# Patient Record
Sex: FEMALE | Race: WHITE | Employment: OTHER | URBAN - METROPOLITAN AREA
[De-identification: names, ages, dates, MRNs, and addresses within clinical notes are randomized per-mention and may not be internally consistent; named-entity substitution may affect disease eponyms.]

---

## 2018-01-24 NOTE — PROGRESS NOTES
Pre call completed with patient regarding upcoming kyphoplasty. Patient states her last dose of Aspirin was 1/24/2018.

## 2018-01-25 ENCOUNTER — ANESTHESIA (OUTPATIENT)
Dept: INTERVENTIONAL RADIOLOGY/VASCULAR | Age: 83
End: 2018-01-25
Payer: MEDICARE

## 2018-01-25 ENCOUNTER — HOSPITAL ENCOUNTER (OUTPATIENT)
Dept: INTERVENTIONAL RADIOLOGY/VASCULAR | Age: 83
Discharge: HOME | End: 2018-01-25
Attending: ORTHOPAEDIC SURGERY
Payer: MEDICARE

## 2018-01-25 ENCOUNTER — ANESTHESIA EVENT (OUTPATIENT)
Dept: INTERVENTIONAL RADIOLOGY/VASCULAR | Age: 83
End: 2018-01-25
Payer: MEDICARE

## 2018-01-25 VITALS
BODY MASS INDEX: 18.21 KG/M2 | HEART RATE: 91 BPM | SYSTOLIC BLOOD PRESSURE: 120 MMHG | OXYGEN SATURATION: 91 % | RESPIRATION RATE: 23 BRPM | HEIGHT: 67 IN | DIASTOLIC BLOOD PRESSURE: 57 MMHG | WEIGHT: 116 LBS | TEMPERATURE: 98.2 F

## 2018-01-25 DIAGNOSIS — S22.069A: ICD-10-CM

## 2018-01-25 DIAGNOSIS — S22.060S TRAUMATIC COMPRESSION FRACTURE OF T8 THORACIC VERTEBRA, SEQUELA: ICD-10-CM

## 2018-01-25 LAB
ANION GAP SERPL CALC-SCNC: 6 MMOL/L (ref 5–15)
ATRIAL RATE: 74 BPM
BASOPHILS # BLD: 0.1 K/UL (ref 0–0.1)
BASOPHILS NFR BLD: 1 % (ref 0–1)
BUN SERPL-MCNC: 9 MG/DL (ref 6–20)
BUN/CREAT SERPL: 13 (ref 12–20)
CALCIUM SERPL-MCNC: 9.3 MG/DL (ref 8.5–10.1)
CALCULATED P AXIS, ECG09: 101 DEGREES
CALCULATED R AXIS, ECG10: -36 DEGREES
CALCULATED T AXIS, ECG11: 62 DEGREES
CHLORIDE SERPL-SCNC: 97 MMOL/L (ref 97–108)
CO2 SERPL-SCNC: 25 MMOL/L (ref 21–32)
CREAT SERPL-MCNC: 0.67 MG/DL (ref 0.55–1.02)
DIAGNOSIS, 93000: NORMAL
EOSINOPHIL # BLD: 0.1 K/UL (ref 0–0.4)
EOSINOPHIL NFR BLD: 1 % (ref 0–7)
ERYTHROCYTE [DISTWIDTH] IN BLOOD BY AUTOMATED COUNT: 14.1 % (ref 11.5–14.5)
GLUCOSE SERPL-MCNC: 91 MG/DL (ref 65–100)
HCT VFR BLD AUTO: 46.7 % (ref 35–47)
HGB BLD-MCNC: 15.8 G/DL (ref 11.5–16)
LYMPHOCYTES # BLD: 1.2 K/UL (ref 0.8–3.5)
LYMPHOCYTES NFR BLD: 12 % (ref 12–49)
MCH RBC QN AUTO: 30.4 PG (ref 26–34)
MCHC RBC AUTO-ENTMCNC: 33.8 G/DL (ref 30–36.5)
MCV RBC AUTO: 90 FL (ref 80–99)
MONOCYTES # BLD: 0.7 K/UL (ref 0–1)
MONOCYTES NFR BLD: 7 % (ref 5–13)
NEUTS SEG # BLD: 7.9 K/UL (ref 1.8–8)
NEUTS SEG NFR BLD: 79 % (ref 32–75)
P-R INTERVAL, ECG05: 164 MS
PLATELET # BLD AUTO: 342 K/UL (ref 150–400)
POTASSIUM SERPL-SCNC: 4.6 MMOL/L (ref 3.5–5.1)
Q-T INTERVAL, ECG07: 376 MS
QRS DURATION, ECG06: 72 MS
QTC CALCULATION (BEZET), ECG08: 417 MS
RBC # BLD AUTO: 5.19 M/UL (ref 3.8–5.2)
SODIUM SERPL-SCNC: 128 MMOL/L (ref 136–145)
VENTRICULAR RATE, ECG03: 74 BPM
WBC # BLD AUTO: 9.9 K/UL (ref 3.6–11)

## 2018-01-25 PROCEDURE — 77030034842 HC TAMP SPN BN INFL EXP II KYPH -I

## 2018-01-25 PROCEDURE — 77030003666 HC NDL SPINAL BD -A

## 2018-01-25 PROCEDURE — 77030030399

## 2018-01-25 PROCEDURE — 74011000250 HC RX REV CODE- 250

## 2018-01-25 PROCEDURE — 74011250637 HC RX REV CODE- 250/637: Performed by: RADIOLOGY

## 2018-01-25 PROCEDURE — 74011250636 HC RX REV CODE- 250/636: Performed by: RADIOLOGY

## 2018-01-25 PROCEDURE — 80048 BASIC METABOLIC PNL TOTAL CA: CPT | Performed by: ANESTHESIOLOGY

## 2018-01-25 PROCEDURE — 77030021783 HC SYS CEM DEL MEDT -D

## 2018-01-25 PROCEDURE — 74011250636 HC RX REV CODE- 250/636

## 2018-01-25 PROCEDURE — 76060000033 HC ANESTHESIA 1 TO 1.5 HR

## 2018-01-25 PROCEDURE — 93005 ELECTROCARDIOGRAM TRACING: CPT

## 2018-01-25 PROCEDURE — 74011636320 HC RX REV CODE- 636/320: Performed by: RADIOLOGY

## 2018-01-25 PROCEDURE — 85025 COMPLETE CBC W/AUTO DIFF WBC: CPT | Performed by: ANESTHESIOLOGY

## 2018-01-25 PROCEDURE — 22513 PERQ VERTEBRAL AUGMENTATION: CPT

## 2018-01-25 PROCEDURE — C1713 ANCHOR/SCREW BN/BN,TIS/BN: HCPCS

## 2018-01-25 PROCEDURE — 77030021782 HC SYS CEM CART DEL KYPH -C

## 2018-01-25 PROCEDURE — 74011000250 HC RX REV CODE- 250: Performed by: RADIOLOGY

## 2018-01-25 PROCEDURE — 36415 COLL VENOUS BLD VENIPUNCTURE: CPT | Performed by: ANESTHESIOLOGY

## 2018-01-25 RX ORDER — IPRATROPIUM BROMIDE 0.5 MG/2.5ML
SOLUTION RESPIRATORY (INHALATION)
Status: COMPLETED
Start: 2018-01-25 | End: 2018-01-25

## 2018-01-25 RX ORDER — PROPOFOL 10 MG/ML
INJECTION, EMULSION INTRAVENOUS
Status: DISCONTINUED | OUTPATIENT
Start: 2018-01-25 | End: 2018-01-25 | Stop reason: HOSPADM

## 2018-01-25 RX ORDER — ZOLPIDEM TARTRATE 10 MG/1
10 TABLET ORAL
COMMUNITY

## 2018-01-25 RX ORDER — BUPIVACAINE HYDROCHLORIDE 5 MG/ML
5 INJECTION, SOLUTION EPIDURAL; INTRACAUDAL
Status: COMPLETED | OUTPATIENT
Start: 2018-01-25 | End: 2018-01-25

## 2018-01-25 RX ORDER — BUPIVACAINE HYDROCHLORIDE 5 MG/ML
5 INJECTION, SOLUTION EPIDURAL; INTRACAUDAL
Status: DISCONTINUED | OUTPATIENT
Start: 2018-01-25 | End: 2018-01-25 | Stop reason: DRUGHIGH

## 2018-01-25 RX ORDER — GUAIFENESIN 100 MG/5ML
81 LIQUID (ML) ORAL DAILY
COMMUNITY

## 2018-01-25 RX ORDER — AMLODIPINE AND BENAZEPRIL HYDROCHLORIDE 5; 20 MG/1; MG/1
1 CAPSULE ORAL DAILY
COMMUNITY

## 2018-01-25 RX ORDER — FENTANYL CITRATE 50 UG/ML
INJECTION, SOLUTION INTRAMUSCULAR; INTRAVENOUS AS NEEDED
Status: DISCONTINUED | OUTPATIENT
Start: 2018-01-25 | End: 2018-01-25 | Stop reason: HOSPADM

## 2018-01-25 RX ORDER — SODIUM CHLORIDE 9 MG/ML
25 INJECTION, SOLUTION INTRAVENOUS CONTINUOUS
Status: DISCONTINUED | OUTPATIENT
Start: 2018-01-25 | End: 2018-01-29 | Stop reason: HOSPADM

## 2018-01-25 RX ORDER — TRAMADOL HYDROCHLORIDE 50 MG/1
50 TABLET ORAL
COMMUNITY

## 2018-01-25 RX ORDER — PREGABALIN 75 MG/1
75 CAPSULE ORAL 2 TIMES DAILY
COMMUNITY

## 2018-01-25 RX ORDER — ALBUTEROL SULFATE 0.83 MG/ML
SOLUTION RESPIRATORY (INHALATION)
Status: COMPLETED
Start: 2018-01-25 | End: 2018-01-25

## 2018-01-25 RX ORDER — OXYCODONE AND ACETAMINOPHEN 5; 325 MG/1; MG/1
1 TABLET ORAL ONCE
Status: COMPLETED | OUTPATIENT
Start: 2018-01-25 | End: 2018-01-25

## 2018-01-25 RX ORDER — ALBUTEROL SULFATE 90 UG/1
2 AEROSOL, METERED RESPIRATORY (INHALATION)
COMMUNITY

## 2018-01-25 RX ORDER — KETAMINE HYDROCHLORIDE 100 MG/ML
INJECTION, SOLUTION INTRAMUSCULAR; INTRAVENOUS AS NEEDED
Status: DISCONTINUED | OUTPATIENT
Start: 2018-01-25 | End: 2018-01-25 | Stop reason: HOSPADM

## 2018-01-25 RX ORDER — LIDOCAINE HYDROCHLORIDE 20 MG/ML
INJECTION, SOLUTION EPIDURAL; INFILTRATION; INTRACAUDAL; PERINEURAL AS NEEDED
Status: DISCONTINUED | OUTPATIENT
Start: 2018-01-25 | End: 2018-01-25 | Stop reason: HOSPADM

## 2018-01-25 RX ORDER — MIDAZOLAM HYDROCHLORIDE 1 MG/ML
INJECTION, SOLUTION INTRAMUSCULAR; INTRAVENOUS AS NEEDED
Status: DISCONTINUED | OUTPATIENT
Start: 2018-01-25 | End: 2018-01-25 | Stop reason: HOSPADM

## 2018-01-25 RX ORDER — IPRATROPIUM BROMIDE AND ALBUTEROL SULFATE 2.5; .5 MG/3ML; MG/3ML
3 SOLUTION RESPIRATORY (INHALATION)
Status: DISPENSED | OUTPATIENT
Start: 2018-01-25 | End: 2018-01-25

## 2018-01-25 RX ORDER — ALENDRONATE SODIUM 70 MG/1
70 TABLET ORAL
COMMUNITY

## 2018-01-25 RX ORDER — LIDOCAINE HYDROCHLORIDE 20 MG/ML
20 INJECTION, SOLUTION INFILTRATION; PERINEURAL ONCE
Status: COMPLETED | OUTPATIENT
Start: 2018-01-25 | End: 2018-01-25

## 2018-01-25 RX ORDER — PHENYLEPHRINE HCL IN 0.9% NACL 0.4MG/10ML
SYRINGE (ML) INTRAVENOUS AS NEEDED
Status: DISCONTINUED | OUTPATIENT
Start: 2018-01-25 | End: 2018-01-25 | Stop reason: HOSPADM

## 2018-01-25 RX ORDER — CEFAZOLIN SODIUM/WATER 2 G/20 ML
2 SYRINGE (ML) INTRAVENOUS ONCE
Status: COMPLETED | OUTPATIENT
Start: 2018-01-25 | End: 2018-01-25

## 2018-01-25 RX ORDER — HEPARIN SODIUM 200 [USP'U]/100ML
400 INJECTION, SOLUTION INTRAVENOUS ONCE
Status: DISCONTINUED | OUTPATIENT
Start: 2018-01-25 | End: 2018-01-25 | Stop reason: ALTCHOICE

## 2018-01-25 RX ADMIN — FENTANYL CITRATE 25 MCG: 50 INJECTION, SOLUTION INTRAMUSCULAR; INTRAVENOUS at 10:31

## 2018-01-25 RX ADMIN — KETAMINE HYDROCHLORIDE 20 MG: 100 INJECTION, SOLUTION INTRAMUSCULAR; INTRAVENOUS at 10:31

## 2018-01-25 RX ADMIN — ALBUTEROL SULFATE 2.5 MG: 2.5 SOLUTION RESPIRATORY (INHALATION) at 09:33

## 2018-01-25 RX ADMIN — KETAMINE HYDROCHLORIDE 10 MG: 100 INJECTION, SOLUTION INTRAMUSCULAR; INTRAVENOUS at 10:56

## 2018-01-25 RX ADMIN — FENTANYL CITRATE 25 MCG: 50 INJECTION, SOLUTION INTRAMUSCULAR; INTRAVENOUS at 10:26

## 2018-01-25 RX ADMIN — SODIUM CHLORIDE 25 ML/HR: 900 INJECTION, SOLUTION INTRAVENOUS at 09:34

## 2018-01-25 RX ADMIN — LIDOCAINE HYDROCHLORIDE 40 MG: 20 INJECTION, SOLUTION EPIDURAL; INFILTRATION; INTRACAUDAL; PERINEURAL at 10:31

## 2018-01-25 RX ADMIN — IPRATROPIUM BROMIDE 0.5 MG: 0.5 SOLUTION RESPIRATORY (INHALATION) at 09:33

## 2018-01-25 RX ADMIN — OXYCODONE HYDROCHLORIDE AND ACETAMINOPHEN 1 TABLET: 5; 325 TABLET ORAL at 13:34

## 2018-01-25 RX ADMIN — BUPIVACAINE HYDROCHLORIDE 10 ML: 5 INJECTION, SOLUTION EPIDURAL; INTRACAUDAL; PERINEURAL at 11:14

## 2018-01-25 RX ADMIN — PROPOFOL 25 MCG/KG/MIN: 10 INJECTION, EMULSION INTRAVENOUS at 10:31

## 2018-01-25 RX ADMIN — LIDOCAINE HYDROCHLORIDE 400 MG: 20 INJECTION, SOLUTION INFILTRATION; PERINEURAL at 11:13

## 2018-01-25 RX ADMIN — IOPAMIDOL 15 ML: 612 INJECTION, SOLUTION INTRATHECAL at 11:13

## 2018-01-25 RX ADMIN — Medication 2 G: at 09:58

## 2018-01-25 RX ADMIN — Medication 80 MCG: at 11:16

## 2018-01-25 RX ADMIN — MIDAZOLAM HYDROCHLORIDE 1 MG: 1 INJECTION, SOLUTION INTRAMUSCULAR; INTRAVENOUS at 10:26

## 2018-01-25 NOTE — ANESTHESIA PREPROCEDURE EVALUATION
Anesthetic History   No history of anesthetic complications            Review of Systems / Medical History  Patient summary reviewed, nursing notes reviewed and pertinent labs reviewed    Pulmonary    COPD      Shortness of breath and smoker      Comments: smokes 1/2 - 1 PPD   Neuro/Psych             Comments: Anxiety Cardiovascular    Hypertension              Exercise tolerance: <4 METS  Comments: PACs   GI/Hepatic/Renal  Within defined limits              Endo/Other  Within defined limits           Other Findings   Comments: Thoracic compression fractures         Physical Exam    Airway  Mallampati: III    Neck ROM: normal range of motion   Mouth opening: Normal     Cardiovascular  Regular rate and rhythm,  S1 and S2 normal,  no murmur, click, rub, or gallop             Dental    Dentition: Full lower dentures and Full upper dentures     Pulmonary  Breath sounds clear to auscultation               Abdominal  GI exam deferred       Other Findings            Anesthetic Plan    ASA: 3  Anesthesia type: total IV anesthesia          Induction: Intravenous  Anesthetic plan and risks discussed with: Patient

## 2018-01-25 NOTE — ANESTHESIA POSTPROCEDURE EVALUATION
Post-Anesthesia Evaluation and Assessment    Patient: Daniela Edmond MRN: [id number]  SSN: [id number]    YOB: 1935  Age: [de-identified] y.o. Sex: female       Cardiovascular Function/Vital Signs  Visit Vitals    /62    Pulse 89    Temp 36.8 °C (98.2 °F)    Resp 20    Ht 5' 7\" (1.702 m)    Wt 52.6 kg (116 lb)    SpO2 94%    Breastfeeding No    BMI 18.17 kg/m2       Patient is status post total IV anesthesia, MAC anesthesia for * No procedures listed *. Nausea/Vomiting: None    Postoperative hydration reviewed and adequate. Pain:  Pain Scale 1: Numeric (0 - 10) (01/25/18 1215)  Pain Intensity 1: 8 (01/25/18 0939)   Managed    Neurological Status: At baseline    Mental Status and Level of Consciousness: Arousable    Pulmonary Status:   O2 Device: Room air (01/25/18 1215)   Adequate oxygenation and airway patent    Complications related to anesthesia: None    Post-anesthesia assessment completed.  No concerns    Signed By: Wilman Marinelli MD     January 25, 2018

## 2018-01-25 NOTE — DISCHARGE INSTRUCTIONS
[address]  Special Procedures/Radiology Department      Radiologist: Nimo Sabillon    Date:  1/25/18    Kyphoplasty Discharge Instructions    Restrict your activity the next 24 hours. Resume your previous diet and follow medication reconciliation form. You may take Tylenol, as directed on the label, for pain or discomfort. Avoid ibuprofen (Advil, Motrin) and aspirin as they may cause bleeding, or continue your prescription pain medications as directed. Avoid lifting anything heavier than 5 pounds. Avoid excessive bending and lifting for one week    Be sure to follow up with your physician, and let him know how you are progressing. If you have severe pain, numbness, tingling, or weakness in your legs go to the nearest emergency department, and tell them you have had a kyphoplasty.

## 2018-01-25 NOTE — IP AVS SNAPSHOT
[address] [facility] 
[phone] Patient: Eloina Marcelino MRN: [id number] About your hospitalization You were admitted on:  2018 You last received care in the:  [facility] You were discharged on:  2018 Why you were hospitalized Your primary diagnosis was:  Not on File Follow-up Information None Your Scheduled Appointments  10:00 AM EST  
IR KYPHO THOR with Alonso Lee MD, [address] ANGIO 1, [facility] ANEST[facility]ESIA  
[facility] ([address]) [address] [facility]  
[phone] DIET RESTRICTIONS NPO, do not eat or drink 8 hours prior to test. Take all medications not requiring food with sip of water, excluding blood thinners and diabetic medications. GENERAL INSTRUCTIONS 1. Bring any non [address] [place] [address] facility films/images pertaining to the area of interest with you on the day of appointment. 2. Bring a list of all medications you are currently taking, including over the counter medications. 3. A valid order with Physicians signature is required for all scheduled tests. 4. Blood thinners and platelet inhibitors must be stopped 3-5 days prior to procedure. Consult your ordering physician prior to stopping them. 5. Time given is ARRIVAL time, not procedure time. 6. You must have a  present before a procedure is started. 7. The procedure may last 1-1 ½ hours. You may be required to stay 4-6 hours after the procedure for observation. If you have any questions please direct them to your ordering physician. Please report to Medical Office Billing 1 ([address] [facility],[address]), Outpatient Registration. Appointment time is arrival time unless otherwise noted. Discharge Orders None A check kasey indicates which time of day the medication should be taken. My Medications ASK your doctor about these medications Instructions Each Dose to Equal  
 Morning Noon Evening Bedtime  
 alendronate 70 mg tablet Commonly known as:  FOSAMAX Your last dose was: Your next dose is: Take 70 mg by mouth every seven (7) days. 70 mg  
    
   
   
   
  
 AMBIEN 10 mg tablet Generic drug:  zolpidem Your last dose was: Your next dose is: Take 10 mg by mouth nightly as needed for Sleep. 10 mg  
    
   
   
   
  
 amLODIPine-benazepril 5-20 mg per capsule Commonly known as:  Dahlia Phoenix Your last dose was: Your next dose is: Take 1 Cap by mouth daily. 1 Cap  
    
   
   
   
  
 aspirin 81 mg chewable tablet Your last dose was: Your next dose is: Take 81 mg by mouth daily. 81 mg CALCIUM 600 + D 600-125 mg-unit Tab Generic drug:  calcium-cholecalciferol (d3) Your last dose was: Your next dose is: Take 1,200 mg by mouth. 1200 mg  
    
   
   
   
  
 LYRICA 75 mg capsule Generic drug:  pregabalin Your last dose was: Your next dose is: Take 75 mg by mouth two (2) times a day. 75 mg PROAIR HFA 90 mcg/actuation inhaler Generic drug:  albuterol Your last dose was: Your next dose is: Take 2 Puffs by inhalation. 2 Puff SPIRIVA WITH HANDIHALER 18 mcg inhalation capsule Generic drug:  tiotropium Your last dose was: Your next dose is: Take 1 Cap by inhalation daily. 1 Cap  
    
   
   
   
  
 traMADol 50 mg tablet Commonly known as:  ULTRAM  
   
Your last dose was: Your next dose is: Take 50 mg by mouth every six (6) hours as needed for Pain. 50 mg  
    
   
   
   
  
 VIT D3-FOLIC [id number] PO Your last dose was: Your next dose is: Take 2,000 Units by mouth. 2000 Units Discharge Instructions [facility] Special Procedures/Radiology Department Radiologist: Demetrio Colvin Date:  1/25/18 Kyphoplasty Discharge Instructions Restrict your activity the next 24 hours. Resume your previous diet and follow medication reconciliation form. You may take Tylenol, as directed on the label, for pain or discomfort. Avoid ibuprofen (Advil, Motrin) and aspirin as they may cause bleeding, or continue your prescription pain medications as directed. Avoid lifting anything heavier than 5 pounds. Avoid excessive bending and lifting for one week Be sure to follow up with your physician, and let him know how you are progressing. If you have severe pain, numbness, tingling, or weakness in your legs go to the nearest emergency department, and tell them you have had a kyphoplasty. Introducing [facility]! [facility] introduces [email or web address] patient portal. Now you can access parts of your medical record, email your doctor's office, and request medication refills online. 1. In your internet browser, go to [email or web address] 2. Click on the First Time User? Click Here link in the Sign In box. You will see the New Member Sign Up page. 3. Enter your [email or web address] Access Code exactly as it appears below. You will not need to use this code after youve completed the sign-up process. If you do not sign up before the expiration date, you must request a new code. · [email or web address] Access Code: [email or web address] Expires: 4/23/2018  4:15 PM 
 
4. Enter the last four digits of your Social Security Number (xxxx) and Date of Birth (mm/dd/yyyy) as indicated and click Submit. You will be taken to the next sign-up page. 5. Create a [email or web address] ID.  This will be your [email or web address] login ID and cannot be changed, so think of one that is secure and easy to remember. 6. Create a [employer] password. You can change your password at any time. 7. Enter your Password Reset Question and Answer. This can be used at a later time if you forget your password. 8. Enter your e-mail address. You will receive e-mail notification when new information is available in [address] 9. Click Sign Up. You can now view and download portions of your medical record. 10. Click the Download Summary menu link to download a portable copy of your medical information. If you have questions, please visit the Frequently Asked Questions section of the [employer] website. Remember, [employer] is NOT to be used for urgent needs. For medical emergencies, dial 911. Now available from your iPhone and Android! Unresulted Labs-Please follow up with your PCP about these lab tests Order Current Status IR KYPHOPLASTY THORACIC In process METABOLIC PANEL, BASIC In process Providers Seen During Your Hospitalization Provider Specialty Primary office phone Freddy Almonte MD Orthopedic Surgery [phone] Your Primary Care Physician (PCP) Primary Care Physician Office Phone Office Fax NOT ON FILE ** None ** ** None ** You are allergic to the following Not on File Recent Documentation Height Weight Breastfeeding? BMI Smoking Status 1.702 m 52.6 kg No 18.17 kg/m2 Current Every Day Smoker Emergency Contacts Name Discharge Info Relation Home Work Mobile [address] [facility]  CAREGIVER [3] Daughter [21] [phone] Patient Belongings The following personal items are in your possession at time of discharge: 
     Visual Aid: Glasses Please provide this summary of care documentation to your next provider.  
  
  
 
  
Signatures-by signing, you are acknowledging that this After Visit Summary has been reviewed with you and you have received a copy. Patient Signature:  ____________________________________________________________ Date:  ____________________________________________________________  
  
Sheryl Venice Provider Signature:  ____________________________________________________________ Date:  ____________________________________________________________

## 2018-01-25 NOTE — ROUTINE PROCESS
0945- Pt in for kyphoplasty. Anesthesia in to assess pt. Labs, EKG ordered and obtained. Neb ordered per anesthesia and administered. 200- Dr Ayden Mathis in to assess pt. Pt consents to kyphoplasty. Dtr Kelsy Al at bedside. 1025- Pt to angio. Anesthesia in room. Skin tear to right elbow with transfer to table. Area cleansed and tegraderm placed. Anesthesia updated. 1130-Pt in recovery. Resting quietly. 1200- Dtr at bedside. Pt resting comfortably. 1230- Saurav PO. Pt able to move all 4 ext. Pedal pushes strong BLE. Drsg D/I to back. Pt states PL4. Aching to back. Dr Ayden Mathis updated. Order for percocet for ride home. Discharge instructions reviewed with dtr and pt. Verbal understanding received. 1330- Discharged to home with dtr. Pt ambulated to w/c. Percocet given for discomfort and long ride home.

## 2018-01-25 NOTE — IP AVS SNAPSHOT
Summary of Care Report The Summary of Care report has been created to help improve care coordination. Users with access to [employer] or [address] (Web-based application) may access additional patient information including the Discharge Summary. If you are not currently a [address] user and need more information, please call the number listed below in the [address] section and ask to be connected with Medical Records. Facility Information Name Address Phone [address] [address] [phone] Patient Information Patient Name Sex  Emanuel Barth ([id number]) Female 1935 Discharge Information Admitting Provider Service Area Unit  
 (none) [address] [facility] Brenda Hicks / [phone] Discharge Provider Discharge Date/Time Discharge Disposition Destination (none) (none) (none) (none) Hospital Problems as of 2018  Reviewed: 2018  9:05 AM by Haleigh Marrero MD  
 None Non-Hospital Problems as of 2018  Reviewed: 2018  9:05 AM by Haleigh Marrero MD  
 None You are allergic to the following Not on File Current Discharge Medication List  
  
ASK your doctor about these medications Dose & Instructions Dispensing Information Comments  
 alendronate 70 mg tablet Commonly known as:  FOSAMAX Dose:  70 mg Take 70 mg by mouth every seven (7) days. Refills:  0 AMBIEN 10 mg tablet Generic drug:  zolpidem Dose:  10 mg Take 10 mg by mouth nightly as needed for Sleep. Refills:  0  
   
 amLODIPine-benazepril 5-20 mg per capsule Commonly known as:  Chuck Mary Lou Dose:  1 Cap Take 1 Cap by mouth daily. Refills:  0  
   
 aspirin 81 mg chewable tablet Dose:  81 mg Take 81 mg by mouth daily. Refills:  0  
   
 CALCIUM 600 + D 600-125 mg-unit Tab Generic drug:  calcium-cholecalciferol (d3) Dose:  1200 mg Take 1,200 mg by mouth. Refills:  0 LYRICA 75 mg capsule Generic drug:  pregabalin Dose:  75 mg Take 75 mg by mouth two (2) times a day. Refills:  0 PROAIR HFA 90 mcg/actuation inhaler Generic drug:  albuterol Dose:  2 Puff Take 2 Puffs by inhalation. Refills:  0 SPIRIVA WITH HANDIHALER 18 mcg inhalation capsule Generic drug:  tiotropium Dose:  1 Cap Take 1 Cap by inhalation daily. Refills:  0  
   
 traMADol 50 mg tablet Commonly known as:  ULTRAM  
 Dose:  50 mg Take 50 mg by mouth every six (6) hours as needed for Pain. Refills:  0  
   
 VIT D3-FOLIC [id number] PO Dose:  2000 Units Take 2,000 Units by mouth. Refills:  0 Surgery Information ID Date/Time Status Primary Surgeon All Procedures Location [id number] 1/25/2018 Unposted   [facility] OR-DO NOT SCHEDULE Follow-up Information None Discharge Instructions [facility] Special Procedures/Radiology Department Radiologist: Abdelrahman Holloway Date:  1/25/18 Kyphoplasty Discharge Instructions Restrict your activity the next 24 hours. Resume your previous diet and follow medication reconciliation form. You may take Tylenol, as directed on the label, for pain or discomfort. Avoid ibuprofen (Advil, Motrin) and aspirin as they may cause bleeding, or continue your prescription pain medications as directed. Avoid lifting anything heavier than 5 pounds. Avoid excessive bending and lifting for one week Be sure to follow up with your physician, and let him know how you are progressing. If you have severe pain, numbness, tingling, or weakness in your legs go to the nearest emergency department, and tell them you have had a kyphoplasty. Chart Review Routing History No Routing History on File

## 2018-01-25 NOTE — IP AVS SNAPSHOT
[address] [facility] 
[phone] Patient: Yessy Henry MRN: [id number] DCA:6/87/7407 A check kasey indicates which time of day the medication should be taken. My Medications ASK your doctor about these medications Instructions Each Dose to Equal  
 Morning Noon Evening Bedtime  
 alendronate 70 mg tablet Commonly known as:  FOSAMAX Your last dose was: Your next dose is: Take 70 mg by mouth every seven (7) days. 70 mg  
    
   
   
   
  
 AMBIEN 10 mg tablet Generic drug:  zolpidem Your last dose was: Your next dose is: Take 10 mg by mouth nightly as needed for Sleep. 10 mg  
    
   
   
   
  
 amLODIPine-benazepril 5-20 mg per capsule Commonly known as:  Ale Ware Your last dose was: Your next dose is: Take 1 Cap by mouth daily. 1 Cap  
    
   
   
   
  
 aspirin 81 mg chewable tablet Your last dose was: Your next dose is: Take 81 mg by mouth daily. 81 mg CALCIUM 600 + D 600-125 mg-unit Tab Generic drug:  calcium-cholecalciferol (d3) Your last dose was: Your next dose is: Take 1,200 mg by mouth. 1200 mg  
    
   
   
   
  
 LYRICA 75 mg capsule Generic drug:  pregabalin Your last dose was: Your next dose is: Take 75 mg by mouth two (2) times a day. 75 mg PROAIR HFA 90 mcg/actuation inhaler Generic drug:  albuterol Your last dose was: Your next dose is: Take 2 Puffs by inhalation. 2 Puff SPIRIVA WITH HANDIHALER 18 mcg inhalation capsule Generic drug:  tiotropium Your last dose was: Your next dose is: Take 1 Cap by inhalation daily. 1 Cap  
    
   
   
   
  
 traMADol 50 mg tablet Commonly known as:  ULTRAM  
   
Your last dose was: Your next dose is: Take 50 mg by mouth every six (6) hours as needed for Pain. 50 mg  
    
   
   
   
  
 VIT D3-FOLIC [id number] PO Your last dose was: Your next dose is: Take 2,000 Units by mouth. 2000 Units